# Patient Record
Sex: MALE | URBAN - METROPOLITAN AREA
[De-identification: names, ages, dates, MRNs, and addresses within clinical notes are randomized per-mention and may not be internally consistent; named-entity substitution may affect disease eponyms.]

---

## 2024-06-11 ENCOUNTER — OFFICE (OUTPATIENT)
Facility: LOCATION | Age: 84
Setting detail: OPHTHALMOLOGY
End: 2024-06-11
Payer: MEDICARE

## 2024-06-11 DIAGNOSIS — H10.523: ICD-10-CM

## 2024-06-11 DIAGNOSIS — H40.013: ICD-10-CM

## 2024-06-11 PROCEDURE — 92083 EXTENDED VISUAL FIELD XM: CPT | Performed by: OPTOMETRIST

## 2024-06-11 PROCEDURE — 99213 OFFICE O/P EST LOW 20 MIN: CPT | Performed by: OPTOMETRIST

## 2024-06-11 PROCEDURE — 92250 FUNDUS PHOTOGRAPHY W/I&R: CPT | Performed by: OPTOMETRIST

## 2024-06-11 ASSESSMENT — LID EXAM ASSESSMENTS
OS_ANGULAR_BLEPHARITIS: 1+
OD_ANGULAR_BLEPHARITIS: 2+

## 2024-07-09 ENCOUNTER — OFFICE (OUTPATIENT)
Facility: LOCATION | Age: 84
Setting detail: OPHTHALMOLOGY
End: 2024-07-09
Payer: MEDICARE

## 2024-07-09 ENCOUNTER — RX ONLY (RX ONLY)
Age: 84
End: 2024-07-09

## 2024-07-09 DIAGNOSIS — H40.013: ICD-10-CM

## 2024-07-09 DIAGNOSIS — H16.223: ICD-10-CM

## 2024-07-09 PROBLEM — H25.12 CATARACT SENILE NUCLEAR SCLEROSIS; LEFT EYE: Status: ACTIVE | Noted: 2024-07-09

## 2024-07-09 PROCEDURE — 99212 OFFICE O/P EST SF 10 MIN: CPT | Performed by: OPTOMETRIST

## 2024-07-09 ASSESSMENT — CONFRONTATIONAL VISUAL FIELD TEST (CVF)
OD_FINDINGS: FULL
OS_FINDINGS: FULL

## 2024-08-20 ENCOUNTER — OFFICE (OUTPATIENT)
Facility: LOCATION | Age: 84
Setting detail: OPHTHALMOLOGY
End: 2024-08-20
Payer: MEDICARE

## 2024-08-20 DIAGNOSIS — H16.223: ICD-10-CM

## 2024-08-20 DIAGNOSIS — H40.013: ICD-10-CM

## 2024-08-20 DIAGNOSIS — H35.721: ICD-10-CM

## 2024-08-20 DIAGNOSIS — H25.12: ICD-10-CM

## 2024-08-20 DIAGNOSIS — H43.813: ICD-10-CM

## 2024-08-20 PROBLEM — E11.9 TYPE 2 DIABETES MELLITUS WITHOUT COMPLICATIONS: Status: ACTIVE | Noted: 2024-08-20

## 2024-08-20 PROCEDURE — 92134 CPTRZ OPH DX IMG PST SGM RTA: CPT | Performed by: OPTOMETRIST

## 2024-08-20 PROCEDURE — 92202 OPSCPY EXTND ON/MAC DRAW: CPT | Performed by: OPTOMETRIST

## 2024-08-20 PROCEDURE — 92014 COMPRE OPH EXAM EST PT 1/>: CPT | Performed by: OPTOMETRIST

## 2024-08-20 ASSESSMENT — CONFRONTATIONAL VISUAL FIELD TEST (CVF)
OD_FINDINGS: FULL
OS_FINDINGS: FULL

## 2024-09-12 ENCOUNTER — RX ONLY (RX ONLY)
Age: 84
End: 2024-09-12

## 2024-09-12 ENCOUNTER — OFFICE (OUTPATIENT)
Facility: LOCATION | Age: 84
Setting detail: OPHTHALMOLOGY
End: 2024-09-12
Payer: MEDICARE

## 2024-09-12 DIAGNOSIS — H43.813: ICD-10-CM

## 2024-09-12 DIAGNOSIS — H35.721: ICD-10-CM

## 2024-09-12 PROCEDURE — 99213 OFFICE O/P EST LOW 20 MIN: CPT | Performed by: OPHTHALMOLOGY

## 2024-09-12 PROCEDURE — 92134 CPTRZ OPH DX IMG PST SGM RTA: CPT | Performed by: OPHTHALMOLOGY

## 2024-09-26 ENCOUNTER — OFFICE (OUTPATIENT)
Facility: LOCATION | Age: 84
Setting detail: OPHTHALMOLOGY
End: 2024-09-26
Payer: MEDICARE

## 2024-09-26 DIAGNOSIS — H43.813: ICD-10-CM

## 2024-09-26 DIAGNOSIS — H35.3211: ICD-10-CM

## 2024-09-26 DIAGNOSIS — H35.3122: ICD-10-CM

## 2024-09-26 PROCEDURE — 92134 CPTRZ OPH DX IMG PST SGM RTA: CPT | Performed by: OPHTHALMOLOGY

## 2024-09-26 PROCEDURE — 99213 OFFICE O/P EST LOW 20 MIN: CPT | Mod: 25 | Performed by: OPHTHALMOLOGY

## 2024-09-26 PROCEDURE — 67028 INJECTION EYE DRUG: CPT | Mod: RT | Performed by: OPHTHALMOLOGY

## 2024-11-14 ENCOUNTER — OFFICE (OUTPATIENT)
Facility: LOCATION | Age: 84
Setting detail: OPHTHALMOLOGY
End: 2024-11-14
Payer: MEDICARE

## 2024-11-14 DIAGNOSIS — H35.3211: ICD-10-CM

## 2024-11-14 PROCEDURE — 92134 CPTRZ OPH DX IMG PST SGM RTA: CPT | Performed by: OPHTHALMOLOGY

## 2024-11-14 PROCEDURE — 67028 INJECTION EYE DRUG: CPT | Mod: RT | Performed by: OPHTHALMOLOGY

## 2024-11-14 ASSESSMENT — REFRACTION_MANIFEST
OD_AXIS: 080
OS_AXIS: 095
OS_SPHERE: +0.75
OD_CYLINDER: -2.25
OD_SPHERE: +0.50
OS_VA1: 20/40+2
OS_CYLINDER: -2.50
OD_VA1: 20/40-2

## 2024-11-14 ASSESSMENT — TEAR BREAK UP TIME (TBUT)
OD_TBUT: 2+
OS_TBUT: 2+

## 2024-11-14 ASSESSMENT — REFRACTION_AUTOREFRACTION
OS_AXIS: 095
OD_SPHERE: +0.50
OS_CYLINDER: -2.50
OS_SPHERE: +0.75
OD_AXIS: 080
OD_CYLINDER: -2.25

## 2024-11-14 ASSESSMENT — VISUAL ACUITY
OD_BCVA: 20/40-1
OS_BCVA: 20/60-2

## 2024-11-14 ASSESSMENT — KERATOMETRY
OD_K1POWER_DIOPTERS: 43.25
OD_AXISANGLE_DEGREES: 168
METHOD_AUTO_MANUAL: MANUAL
OD_K2POWER_DIOPTERS: 44.50
OS_K2POWER_DIOPTERS: 44.50
OS_K1POWER_DIOPTERS: 43.25
OS_AXISANGLE_DEGREES: 003

## 2024-11-14 ASSESSMENT — CONFRONTATIONAL VISUAL FIELD TEST (CVF)
OS_FINDINGS: FULL
OD_FINDINGS: FULL

## 2024-12-05 PROBLEM — K86.2 PANCREAS CYST: Status: ACTIVE | Noted: 2024-12-05

## 2024-12-12 ENCOUNTER — OFFICE (OUTPATIENT)
Facility: LOCATION | Age: 84
Setting detail: OPHTHALMOLOGY
End: 2024-12-12
Payer: MEDICARE

## 2024-12-12 DIAGNOSIS — E11.9: ICD-10-CM

## 2024-12-12 DIAGNOSIS — H35.3211: ICD-10-CM

## 2024-12-12 DIAGNOSIS — H35.3122: ICD-10-CM

## 2024-12-12 DIAGNOSIS — H43.813: ICD-10-CM

## 2024-12-12 PROCEDURE — 67028 INJECTION EYE DRUG: CPT | Mod: RT | Performed by: OPHTHALMOLOGY

## 2024-12-12 PROCEDURE — 99213 OFFICE O/P EST LOW 20 MIN: CPT | Mod: 25 | Performed by: OPHTHALMOLOGY

## 2024-12-12 PROCEDURE — 92134 CPTRZ OPH DX IMG PST SGM RTA: CPT | Performed by: OPHTHALMOLOGY

## 2024-12-12 ASSESSMENT — KERATOMETRY
OS_AXISANGLE_DEGREES: 008
OS_K2POWER_DIOPTERS: 44.50
METHOD_AUTO_MANUAL: MANUAL
OD_AXISANGLE_DEGREES: 177
OD_K1POWER_DIOPTERS: 43.00
OS_K1POWER_DIOPTERS: 43.25
OD_K2POWER_DIOPTERS: 44.50

## 2024-12-12 ASSESSMENT — REFRACTION_AUTOREFRACTION
OS_SPHERE: +0.50
OD_CYLINDER: -2.25
OD_AXIS: 085
OS_AXIS: 089
OD_SPHERE: ++0.25
OS_CYLINDER: -2.75

## 2024-12-12 ASSESSMENT — REFRACTION_MANIFEST
OS_CYLINDER: -2.75
OS_VA1: 20/25-1
OS_SPHERE: +0.50
OD_CYLINDER: -2.25
OD_AXIS: 085
OD_VA1: 20/30-2
OD_SPHERE: ++0.25
OS_AXIS: 089

## 2024-12-12 ASSESSMENT — VISUAL ACUITY
OS_BCVA: 20/
OD_BCVA: 20/

## 2024-12-18 ENCOUNTER — APPOINTMENT (OUTPATIENT)
Dept: GASTROENTEROLOGY | Facility: CLINIC | Age: 84
End: 2024-12-18

## 2024-12-19 ENCOUNTER — OUTPATIENT (OUTPATIENT)
Dept: OUTPATIENT SERVICES | Facility: HOSPITAL | Age: 84
LOS: 1 days | End: 2024-12-19

## 2024-12-19 VITALS
OXYGEN SATURATION: 97 % | SYSTOLIC BLOOD PRESSURE: 116 MMHG | HEART RATE: 70 BPM | TEMPERATURE: 97 F | RESPIRATION RATE: 17 BRPM | HEIGHT: 66 IN | WEIGHT: 141.98 LBS | DIASTOLIC BLOOD PRESSURE: 77 MMHG

## 2024-12-19 DIAGNOSIS — Z98.890 OTHER SPECIFIED POSTPROCEDURAL STATES: Chronic | ICD-10-CM

## 2024-12-19 DIAGNOSIS — K86.2 CYST OF PANCREAS: ICD-10-CM

## 2024-12-19 DIAGNOSIS — E11.9 TYPE 2 DIABETES MELLITUS WITHOUT COMPLICATIONS: ICD-10-CM

## 2024-12-19 RX ORDER — PRAVASTATIN SODIUM 20 MG/1
1 TABLET ORAL
Refills: 0 | DISCHARGE

## 2024-12-19 RX ORDER — ESCITALOPRAM OXALATE 10 MG/1
1 TABLET, FILM COATED ORAL
Refills: 0 | DISCHARGE

## 2024-12-19 RX ORDER — GLIPIZIDE 5 MG/1
1 TABLET, FILM COATED, EXTENDED RELEASE ORAL
Refills: 0 | DISCHARGE

## 2024-12-19 NOTE — H&P PST ADULT - ASSESSMENT
85 y/o Sami speaking male with Type 2 DM and HLD presents to PST preop for endoscopic ultrasound.  Pt underwent MRI of abdomen (pancreas) for symptoms of fatigue and drowsiness that revealed multiple pancreatic cystic lesions and chronic portal vein thrombosis.

## 2024-12-19 NOTE — H&P PST ADULT - CIGARETTES, PACKS/DAY
Area H Indication Text: Tumors in this location are included in Area H (eyelids, eyebrows, nose, lips, chin, ear, pre-auricular, post-auricular, temple, genitalia, hands, feet, ankles and areola).  Tissue conservation is critical in these anatomic locations. 0.5

## 2024-12-19 NOTE — H&P PST ADULT - HISTORY OF PRESENT ILLNESS
85 y/o Danish speaking male with Type 2 DM and HLD presents to PST preop for endoscopic ultrasound.  Pt underwent MRI of abdomen (pancreas) for symptoms of fatigue and drowsiness that revealed multiple pancreatic cystic lesions and chronic portal vein thrombosis.

## 2024-12-19 NOTE — H&P PST ADULT - GASTROINTESTINAL COMMENTS
MR of abdomen revealed pacreatic cysts and chronic portal vein thrombosis. see HPI dx of cyst of pancreas

## 2024-12-19 NOTE — H&P PST ADULT - PROBLEM SELECTOR PLAN 1
preop for endoscopic ultrasound on 12/30/24  preop instructions given, pt verbalized understanding  GI prophylaxis provided

## 2024-12-19 NOTE — H&P PST ADULT - PROBLEM SELECTOR PLAN 3
Postoperative Delirium Screen    Patient eligible for joy risk screen age>75? yes    Health care proxy paperwork given to patient? Yes     Impaired mobility (ie: uses cane, walker, wheelchair, or assist device)? No    Known dementia diagnosis? No    Impaired functional status (METS<4)? No    Malnutrition BMI<20? No

## 2024-12-19 NOTE — H&P PST ADULT - CARDIOVASCULAR COMMENTS
HLD. 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

## 2024-12-19 NOTE — H&P PST ADULT - NSICDXPASTMEDICALHX_GEN_ALL_CORE_FT
PAST MEDICAL HISTORY:  Abdominal aortic aneurysm     Celiac artery aneurysm     Cyst of pancreas     Fatigue     HLD (hyperlipidemia)     Portal vein thrombosis     Type 2 diabetes mellitus

## 2024-12-19 NOTE — H&P PST ADULT - NSANTHOSAYNRD_GEN_A_CORE
Detail Level: Detailed No. GARCÍA screening performed.  STOP BANG Legend: 0-2 = LOW Risk; 3-4 = INTERMEDIATE Risk; 5-8 = HIGH Risk

## 2024-12-30 ENCOUNTER — APPOINTMENT (OUTPATIENT)
Dept: GASTROENTEROLOGY | Facility: HOSPITAL | Age: 84
End: 2024-12-30

## 2025-01-03 PROBLEM — I71.40 ABDOMINAL AORTIC ANEURYSM, WITHOUT RUPTURE, UNSPECIFIED: Chronic | Status: ACTIVE | Noted: 2024-12-19

## 2025-01-03 PROBLEM — K86.2 CYST OF PANCREAS: Chronic | Status: ACTIVE | Noted: 2024-12-19

## 2025-01-03 PROBLEM — E11.9 TYPE 2 DIABETES MELLITUS WITHOUT COMPLICATIONS: Chronic | Status: ACTIVE | Noted: 2024-12-19

## 2025-01-03 PROBLEM — I72.8 ANEURYSM OF OTHER SPECIFIED ARTERIES: Chronic | Status: ACTIVE | Noted: 2024-12-19

## 2025-01-03 PROBLEM — E78.5 HYPERLIPIDEMIA, UNSPECIFIED: Chronic | Status: ACTIVE | Noted: 2024-12-19

## 2025-01-03 PROBLEM — I81 PORTAL VEIN THROMBOSIS: Chronic | Status: ACTIVE | Noted: 2024-12-19

## 2025-01-23 ENCOUNTER — OFFICE (OUTPATIENT)
Facility: LOCATION | Age: 85
Setting detail: OPHTHALMOLOGY
End: 2025-01-23
Payer: MEDICARE

## 2025-01-23 DIAGNOSIS — H35.3211: ICD-10-CM

## 2025-01-23 DIAGNOSIS — E11.9: ICD-10-CM

## 2025-01-23 DIAGNOSIS — H43.813: ICD-10-CM

## 2025-01-23 DIAGNOSIS — H35.3122: ICD-10-CM

## 2025-01-23 PROCEDURE — 67028 INJECTION EYE DRUG: CPT | Mod: RT | Performed by: OPHTHALMOLOGY

## 2025-01-23 PROCEDURE — 99213 OFFICE O/P EST LOW 20 MIN: CPT | Mod: 25 | Performed by: OPHTHALMOLOGY

## 2025-01-23 PROCEDURE — 92134 CPTRZ OPH DX IMG PST SGM RTA: CPT | Performed by: OPHTHALMOLOGY

## 2025-01-23 ASSESSMENT — KERATOMETRY
OS_K2POWER_DIOPTERS: 43.75
OD_K1POWER_DIOPTERS: 44.50
OS_AXISANGLE_DEGREES: 098
OS_K1POWER_DIOPTERS: 44.50
OD_AXISANGLE_DEGREES: 079
OD_K2POWER_DIOPTERS: 43.50
METHOD_AUTO_MANUAL: AUTO

## 2025-01-23 ASSESSMENT — REFRACTION_AUTOREFRACTION
OS_CYLINDER: -2.25
OD_CYLINDER: -1.75
OD_AXIS: 083
OD_SPHERE: -0.25
OS_AXIS: 093
OS_SPHERE: +0.25

## 2025-01-23 ASSESSMENT — REFRACTION_MANIFEST
OD_VA1: 20/40-2
OS_SPHERE: +0.25
OS_AXIS: 093
OD_AXIS: 083
OD_SPHERE: -0.25
OS_CYLINDER: -2.25
OS_VA1: 20/30-1
OD_CYLINDER: -1.75

## 2025-01-23 ASSESSMENT — VISUAL ACUITY
OS_BCVA: 20/
OD_BCVA: 20/

## 2025-01-31 ENCOUNTER — OUTPATIENT (OUTPATIENT)
Dept: OUTPATIENT SERVICES | Facility: HOSPITAL | Age: 85
LOS: 1 days | End: 2025-01-31

## 2025-01-31 VITALS
TEMPERATURE: 98 F | RESPIRATION RATE: 16 BRPM | SYSTOLIC BLOOD PRESSURE: 133 MMHG | WEIGHT: 139.99 LBS | OXYGEN SATURATION: 96 % | DIASTOLIC BLOOD PRESSURE: 74 MMHG | HEART RATE: 72 BPM | HEIGHT: 66 IN

## 2025-01-31 DIAGNOSIS — K86.2 CYST OF PANCREAS: ICD-10-CM

## 2025-01-31 DIAGNOSIS — E11.9 TYPE 2 DIABETES MELLITUS WITHOUT COMPLICATIONS: ICD-10-CM

## 2025-01-31 DIAGNOSIS — Z98.890 OTHER SPECIFIED POSTPROCEDURAL STATES: Chronic | ICD-10-CM

## 2025-01-31 NOTE — H&P PST ADULT - ALCOHOL USE HISTORY SINGLE SELECT
sw pharmacy  Cancelled fentanyl 37 5 mcg rx and confirmed 25 mcg went through with no issue  S/w pt, advised of above  Pt verbalized understanding  Questioned mail order pharmacy  Advised pt, d/w dg at her next ov  Pt verbalized understanding and appreciation  never

## 2025-01-31 NOTE — H&P PST ADULT - PROBLEM SELECTOR PLAN 3
Postop Delirium Screening  Patient eligible for joy risk screen age>75? Yes (if <= 74 then done)    Health care proxy paperwork given to patient? Yes (all patients should be given the packet to fill out at home and return on day of surgery to pre-op RN)    Impaired mobility (ie: uses cane, walker, wheelchair, or assist device)? No    Known dementia diagnosis? No    Impaired functional status (METS<4)? No    Malnutrition BMI<20? No

## 2025-01-31 NOTE — H&P PST ADULT - CARDIOVASCULAR COMMENTS
Hx HLD, on medication management. Infrarenal abdominal aortic aneurysm measuring 3.1cm and celiac artery aneurysm measuring 9mm noted on MRI abdomen 9/2024.

## 2025-01-31 NOTE — H&P PST ADULT - GASTROINTESTINAL COMMENTS
Pre op dx: cyst of pancreas MRI pancreas (10/7/24) showed chronic portal vein thrombosis and pancreatic cysts

## 2025-01-31 NOTE — H&P PST ADULT - NSICDXPASTMEDICALHX_GEN_ALL_CORE_FT
PAST MEDICAL HISTORY:  Abdominal aortic aneurysm     Celiac artery aneurysm     Current smoker     Cyst of pancreas     Fatigue     HLD (hyperlipidemia)     Portal vein thrombosis     Type 2 diabetes mellitus

## 2025-01-31 NOTE — H&P PST ADULT - PROBLEM SELECTOR PLAN 2
Patient instructed to hold Metformin and Glipizide on the morning of procedure. Pt stated understanding.

## 2025-01-31 NOTE — H&P PST ADULT - HISTORY OF PRESENT ILLNESS
84 yr Polish speaking male with PMH of T2DM and HLD  presents to PST for preop evaluation.  Pt underwent MRI for symptoms of fatigue and drowsiness noting to have multiple pancreatic cystic lesions throughout the pancreas. Largest pancreatic cyst in the pancreatic body measuring 3 cm. Patient is scheduled for endoscopic ultrasound on 2/10/2025.     Surgery was initially scheduled on 12/30/2024, but got cancelled as pt didn't arrange transport back to home.

## 2025-02-10 ENCOUNTER — TRANSCRIPTION ENCOUNTER (OUTPATIENT)
Age: 85
End: 2025-02-10

## 2025-02-10 ENCOUNTER — RESULT REVIEW (OUTPATIENT)
Age: 85
End: 2025-02-10

## 2025-02-10 ENCOUNTER — APPOINTMENT (OUTPATIENT)
Dept: GASTROENTEROLOGY | Facility: HOSPITAL | Age: 85
End: 2025-02-10

## 2025-02-10 ENCOUNTER — OUTPATIENT (OUTPATIENT)
Dept: OUTPATIENT SERVICES | Facility: HOSPITAL | Age: 85
LOS: 1 days | Discharge: HOME | End: 2025-02-10
Payer: MEDICARE

## 2025-02-10 VITALS
HEIGHT: 66 IN | DIASTOLIC BLOOD PRESSURE: 79 MMHG | SYSTOLIC BLOOD PRESSURE: 133 MMHG | WEIGHT: 139.99 LBS | TEMPERATURE: 98 F | HEART RATE: 74 BPM | RESPIRATION RATE: 16 BRPM | OXYGEN SATURATION: 97 %

## 2025-02-10 VITALS
SYSTOLIC BLOOD PRESSURE: 117 MMHG | OXYGEN SATURATION: 96 % | RESPIRATION RATE: 13 BRPM | HEART RATE: 70 BPM | DIASTOLIC BLOOD PRESSURE: 79 MMHG

## 2025-02-10 DIAGNOSIS — Z98.890 OTHER SPECIFIED POSTPROCEDURAL STATES: Chronic | ICD-10-CM

## 2025-02-10 DIAGNOSIS — K86.2 CYST OF PANCREAS: ICD-10-CM

## 2025-02-10 LAB — GLUCOSE BLDC GLUCOMTR-MCNC: 125 MG/DL — HIGH (ref 70–99)

## 2025-02-10 PROCEDURE — 88173 CYTOPATH EVAL FNA REPORT: CPT | Mod: 26

## 2025-02-10 PROCEDURE — 43242 EGD US FINE NEEDLE BX/ASPIR: CPT

## 2025-02-10 PROCEDURE — 88305 TISSUE EXAM BY PATHOLOGIST: CPT | Mod: 26

## 2025-02-10 PROCEDURE — 43239 EGD BIOPSY SINGLE/MULTIPLE: CPT | Mod: 59

## 2025-02-10 NOTE — ASU DISCHARGE PLAN (ADULT/PEDIATRIC) - FINANCIAL ASSISTANCE
[facility] provides services at a reduced cost to those who are determined to be eligible through [facility]’s financial assistance program. Information regarding [facility]’s financial assistance program can be found by going to [email or web address] or by calling [phone].

## 2025-02-10 NOTE — PRE PROCEDURE NOTE - PRE PROCEDURE EVALUATION
HPI:    Here for EUS FNA of pancreatic cyst.    PAST MEDICAL & SURGICAL HISTORY:  HLD (hyperlipidemia)      Type 2 diabetes mellitus      Cyst of pancreas      Fatigue      Portal vein thrombosis      Abdominal aortic aneurysm      Celiac artery aneurysm      Current smoker      S/P colonoscopy      S/P endoscopy        See chart.    MEDICATIONS:    See chart.     ALLERGIES:  No Known Allergies    See chart.     SOCIAL HISTORY:     Denies toxic habits.     FAMILY HISTORY:  No pertinent family history in first degree relatives      Noncontributory.     REVIEW OF SYSTEMS:  CONSTITUTIONAL: No weakness, fevers or chills.  EYES/ENT: No visual changes;  No vertigo or throat pain.  NECK: No pain or stiffness.  RESPIRATORY: No cough, wheezing, hemoptysis; No shortness of breath.  CARDIOVASCULAR: No chest pain or palpitations.  GASTROINTESTINAL: As per HPI.   GENITOURINARY: No dysuria, frequency or hematuria.  NEUROLOGICAL: No numbness or weakness.  SKIN: No itching, rashes.      PHYSICAL EXAM:  VITAL SIGNS:  T(C): 36.4 (02-10-25 @ 11:24), Max: 36.4 (02-10-25 @ 11:24)  HR: 74 (02-10-25 @ 11:24) (74 - 74)  BP: 133/79 (02-10-25 @ 11:24) (133/79 - 133/79)  RR: 16 (02-10-25 @ 11:24) (16 - 16)  SpO2: 97% (02-10-25 @ 11:24) (97% - 97%)  I/Os:     See chart.     GENERAL: SMITHA, non toxic, comfortable in bed  HEENT: EOMI, no icterus, no tracheal deviation, moist mucus membranes   CARDIO: Regular rate and rhythm, no murmurs, rubs or gallops  LUNGS: No wheezing, rales or rhonchi  ABDOMEN: Soft, non tender, non distended, no rebound or guarding  VASCULAR: Warm and well perfused without peripheral edema and palpable pulses  PSYCH AAO x 3, normal mood and affect   SKIN: warm, dry, intact     LABS:                 RADIOLOGY & ADDITIONAL TESTS: Reviewed.       Risks, benefits, and alternatives of the procedure discussed at length including but not limited to bleeding, pancreatitis, perforation, anesthesia related complication, infection, etc. Patient expressed understanding and agreeable for procedure. Patient stable for planned procedure.

## 2025-02-10 NOTE — ASU DISCHARGE PLAN (ADULT/PEDIATRIC) - ACCOMPANIED BY
This writer called the pt to confirm that [facility] will be picking up the pt start Jan 31,  For his aftercare partial.  The pt confirm that he understood the directions that he was given by the writer. CHARLENE Lemus  1/29/2019     Friend

## 2025-02-11 LAB — NON-GYNECOLOGICAL CYTOLOGY STUDY: SIGNIFICANT CHANGE UP

## 2025-02-12 LAB — SURGICAL PATHOLOGY STUDY: SIGNIFICANT CHANGE UP

## 2025-02-27 ENCOUNTER — OFFICE (OUTPATIENT)
Facility: LOCATION | Age: 85
Setting detail: OPHTHALMOLOGY
End: 2025-02-27
Payer: MEDICARE

## 2025-02-27 DIAGNOSIS — H35.3211: ICD-10-CM

## 2025-02-27 DIAGNOSIS — H43.813: ICD-10-CM

## 2025-02-27 DIAGNOSIS — H35.3122: ICD-10-CM

## 2025-02-27 DIAGNOSIS — E11.3293: ICD-10-CM

## 2025-02-27 PROCEDURE — 67028 INJECTION EYE DRUG: CPT | Mod: RT | Performed by: OPHTHALMOLOGY

## 2025-02-27 PROCEDURE — 92134 CPTRZ OPH DX IMG PST SGM RTA: CPT | Performed by: OPHTHALMOLOGY

## 2025-02-27 PROCEDURE — 99213 OFFICE O/P EST LOW 20 MIN: CPT | Mod: 25 | Performed by: OPHTHALMOLOGY

## 2025-02-27 ASSESSMENT — REFRACTION_MANIFEST
OD_AXIS: 084
OD_CYLINDER: -2.00
OS_AXIS: 097
OD_VA1: 20/50-2
OD_SPHERE: PLANO
OS_CYLINDER: -2.50
OS_SPHERE: +0.50
OS_VA1: 20/50-1

## 2025-02-27 ASSESSMENT — REFRACTION_AUTOREFRACTION
OS_AXIS: 097
OD_AXIS: 084
OS_SPHERE: +0.50
OD_CYLINDER: -2.00
OD_SPHERE: PLANO
OS_CYLINDER: -2.50

## 2025-02-27 ASSESSMENT — KERATOMETRY
OS_K2POWER_DIOPTERS: 43.75
METHOD_AUTO_MANUAL: AUTO
OS_AXISANGLE_DEGREES: 105
OD_AXISANGLE_DEGREES: 103
OS_K1POWER_DIOPTERS: 44.50
OD_K1POWER_DIOPTERS: 45.00
OD_K2POWER_DIOPTERS: 42.75

## 2025-02-27 ASSESSMENT — VISUAL ACUITY
OS_BCVA: 20/
OD_BCVA: 20/

## 2025-02-28 PROBLEM — F17.200 NICOTINE DEPENDENCE, UNSPECIFIED, UNCOMPLICATED: Chronic | Status: ACTIVE | Noted: 2025-01-31

## 2025-03-06 ENCOUNTER — NON-APPOINTMENT (OUTPATIENT)
Age: 85
End: 2025-03-06

## 2025-03-06 ENCOUNTER — APPOINTMENT (OUTPATIENT)
Dept: SURGICAL ONCOLOGY | Facility: CLINIC | Age: 85
End: 2025-03-06
Payer: MEDICARE

## 2025-03-06 VITALS
DIASTOLIC BLOOD PRESSURE: 78 MMHG | WEIGHT: 140 LBS | HEIGHT: 66 IN | OXYGEN SATURATION: 97 % | SYSTOLIC BLOOD PRESSURE: 128 MMHG | HEART RATE: 78 BPM | BODY MASS INDEX: 22.5 KG/M2

## 2025-03-06 DIAGNOSIS — E11.628 TYPE 2 DIABETES MELLITUS WITH OTHER SKIN COMPLICATIONS: ICD-10-CM

## 2025-03-06 DIAGNOSIS — F17.200 NICOTINE DEPENDENCE, UNSPECIFIED, UNCOMPLICATED: ICD-10-CM

## 2025-03-06 DIAGNOSIS — K86.2 CYST OF PANCREAS: ICD-10-CM

## 2025-03-06 DIAGNOSIS — E78.00 PURE HYPERCHOLESTEROLEMIA, UNSPECIFIED: ICD-10-CM

## 2025-03-06 PROCEDURE — 99204 OFFICE O/P NEW MOD 45 MIN: CPT

## 2025-03-06 RX ORDER — GLIPIZIDE 10 MG/1
10 TABLET, FILM COATED, EXTENDED RELEASE ORAL
Refills: 0 | Status: ACTIVE | COMMUNITY

## 2025-03-06 RX ORDER — PRAVASTATIN SODIUM 20 MG/1
20 TABLET ORAL
Refills: 0 | Status: ACTIVE | COMMUNITY

## 2025-03-06 RX ORDER — METFORMIN HYDROCHLORIDE 500 MG/1
500 TABLET, COATED ORAL
Refills: 0 | Status: ACTIVE | COMMUNITY

## (undated) DEVICE — CONTAINER FORMALIN 80ML YELLOW

## (undated) DEVICE — BITE BLOCK ADULT 20 X 27MM (GREEN)

## (undated) DEVICE — GOWN LG

## (undated) DEVICE — TUBING IV SET GRAVITY 3Y 100" MACRO

## (undated) DEVICE — DRSG CURITY GAUZE SPONGE 4 X 4" 12-PLY NON-STERILE

## (undated) DEVICE — TUBING MEDI-VAC W MAXIGRIP CONNECTORS 1/4"X6'

## (undated) DEVICE — BIOPSY FORCEP RADIAL JAW 4 STANDARD WITH NEEDLE

## (undated) DEVICE — SALIVA EJECTOR (BLUE)

## (undated) DEVICE — CATH IV SAFE BC 22G X 1" (BLUE)

## (undated) DEVICE — PACK IV START WITH CHG

## (undated) DEVICE — DENTURE CUP PINK

## (undated) DEVICE — UNDERPAD LINEN SAVER 17 X 24"

## (undated) DEVICE — NDL ASPIR EXPECT SLIMLINE 22G

## (undated) DEVICE — DRSG BANDAID 0.75X3"

## (undated) DEVICE — CLAMP BX HOT RAD JAW 3

## (undated) DEVICE — LUBRICATING JELLY HR ONE SHOT 3G

## (undated) DEVICE — DRSG 2X2

## (undated) DEVICE — BIOPSY FORCEP COLD DISP

## (undated) DEVICE — ELCTR ECG CONDUCTIVE ADHESIVE

## (undated) DEVICE — BASIN EMESIS 10IN GRADUATED MAUVE